# Patient Record
Sex: MALE
[De-identification: names, ages, dates, MRNs, and addresses within clinical notes are randomized per-mention and may not be internally consistent; named-entity substitution may affect disease eponyms.]

---

## 2021-09-17 ENCOUNTER — NON-APPOINTMENT (OUTPATIENT)
Age: 46
End: 2021-09-17

## 2021-09-22 PROBLEM — Z00.00 ENCOUNTER FOR PREVENTIVE HEALTH EXAMINATION: Status: ACTIVE | Noted: 2021-09-22

## 2021-09-23 ENCOUNTER — APPOINTMENT (OUTPATIENT)
Dept: ORTHOPEDIC SURGERY | Facility: CLINIC | Age: 46
End: 2021-09-23
Payer: COMMERCIAL

## 2021-09-23 ENCOUNTER — NON-APPOINTMENT (OUTPATIENT)
Age: 46
End: 2021-09-23

## 2021-09-23 VITALS
DIASTOLIC BLOOD PRESSURE: 90 MMHG | BODY MASS INDEX: 29.8 KG/M2 | WEIGHT: 220 LBS | HEIGHT: 72 IN | SYSTOLIC BLOOD PRESSURE: 137 MMHG | HEART RATE: 76 BPM

## 2021-09-23 DIAGNOSIS — Z87.891 PERSONAL HISTORY OF NICOTINE DEPENDENCE: ICD-10-CM

## 2021-09-23 DIAGNOSIS — G89.29 LOW BACK PAIN: ICD-10-CM

## 2021-09-23 DIAGNOSIS — Z86.39 PERSONAL HISTORY OF OTHER ENDOCRINE, NUTRITIONAL AND METABOLIC DISEASE: ICD-10-CM

## 2021-09-23 DIAGNOSIS — Z86.59 PERSONAL HISTORY OF OTHER MENTAL AND BEHAVIORAL DISORDERS: ICD-10-CM

## 2021-09-23 DIAGNOSIS — Z87.19 PERSONAL HISTORY OF OTHER DISEASES OF THE DIGESTIVE SYSTEM: ICD-10-CM

## 2021-09-23 DIAGNOSIS — F41.1 GENERALIZED ANXIETY DISORDER: ICD-10-CM

## 2021-09-23 DIAGNOSIS — M47.816 SPONDYLOSIS W/OUT MYELOPATHY OR RADICULOPATHY, LUMBAR REGION: ICD-10-CM

## 2021-09-23 DIAGNOSIS — M54.5 LOW BACK PAIN: ICD-10-CM

## 2021-09-23 PROCEDURE — 99204 OFFICE O/P NEW MOD 45 MIN: CPT

## 2021-09-23 PROCEDURE — 72100 X-RAY EXAM L-S SPINE 2/3 VWS: CPT

## 2021-09-23 RX ORDER — IBUPROFEN 800 MG/1
800 TABLET, FILM COATED ORAL
Qty: 90 | Refills: 0 | Status: ACTIVE | COMMUNITY
Start: 2021-09-23 | End: 1900-01-01

## 2021-09-24 PROBLEM — Z86.39 HISTORY OF TYPE 2 DIABETES MELLITUS: Status: RESOLVED | Noted: 2021-09-24 | Resolved: 2021-09-24

## 2021-09-24 PROBLEM — Z86.39 HISTORY OF HYPERLIPIDEMIA: Status: RESOLVED | Noted: 2021-09-24 | Resolved: 2021-09-24

## 2021-09-24 PROBLEM — Z86.59 HISTORY OF DEPRESSION: Status: RESOLVED | Noted: 2021-09-24 | Resolved: 2021-09-24

## 2021-09-24 PROBLEM — Z87.19 HISTORY OF IRRITABLE BOWEL SYNDROME: Status: RESOLVED | Noted: 2021-09-24 | Resolved: 2021-09-24

## 2021-09-24 PROBLEM — F41.1 GENERALIZED ANXIETY DISORDER: Status: RESOLVED | Noted: 2021-09-24 | Resolved: 2021-09-24

## 2021-09-24 PROBLEM — Z87.891 FORMER SMOKER: Status: ACTIVE | Noted: 2021-09-24

## 2021-09-24 NOTE — REVIEW OF SYSTEMS
[Abdominal Pain] : abdominal pain [Diarrhea] : diarrhea [Depression] : depression [Anxiety] : anxiety [Negative] : Genitourinary

## 2021-09-24 NOTE — HISTORY OF PRESENT ILLNESS
[de-identified] : This 45-year-old male started with symptoms of lower back pain 1 year ago.  The symptoms have been persistent and are getting slightly worse.  He has not had associated leg pain.  He does not have neurologic symptoms of numbness, paresthesias or weakness.  There is no night pain.  The pain is worse with coughing and sneezing and slightly worse forcing to move his bowels.  It is worse with prolonged sitting.  It is no worse driving but it is worse getting in and out of the car.  It is worse with bending and lifting.  The symptoms are chronic soreness, ache or mild pain but there is momentary sharp pain.  Treatment to date has been heat and stretching.\par \par The year ago he was diagnosed with type 2 diabetes.  He is on medication for depression and anxiety as well as hyperlipidemia and he has a diarrhea type irritable bowel. [Pain Location] : pain [2] : a minimum pain level of 2/10 [10] : a maximum pain level of 10/10 [Bending] : worsened by bending [Lifting] : worsened by lifting [Prolonged Sitting] : worsened by prolonged sitting

## 2021-09-24 NOTE — PHYSICAL EXAM
[de-identified] : He is fully alert and oriented with a normal mood and affect.  He is in no acute distress as I take the history.  He ambulates with a normal gait including tiptoe and heel walking.  There are no cutaneous abnormalities or palpable bony defects of the spine.  There is no evidence of shortness of breath or respiratory distress.  There is no paravertebral muscle spasm, sciatic notch tenderness or trochanteric tenderness.  Forward flexion of the spine to 40 degrees causes lower back pain.  His lower extremity neurological examination revealed 1-2+ symmetrical reflexes.  Motor power is normal to manual testing in all lower extremity groups and sensation is normal to light touch in all dermatomes.  Straight leg raising is negative to 90 degrees in the sitting position.  There is symmetrically decreased rotation of both hips but without pain.  Vascular examination shows no evidence of varicosities and there is no lymphedema.  There are no cutaneous abnormalities of the upper extremities or of the lower extremities.  His upper extremities are normal to inspection and his elbows have a full and painless range of motion with normal motor power and normal stability. [de-identified] : AP and lateral x-rays of the lumbar spine reveal normal sagittal alignment.  There are osteophytes extending superiorly up off the anterior surfaces of 4,[id number].  They are nearing arthrodesis at the L2-3, L1-2 and T12-L1 levels.  Disc heights are well-maintained.  There are no destructive changes.

## 2021-09-24 NOTE — DISCUSSION/SUMMARY
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  He has been started on ibuprofen 800 mg 3 times a day as a nonsteroidal anti-inflammatory and will use moist heat.  I will see him for follow-up in 4 weeks.  When his symptoms have resolved he will need to work on range of motion of his hips and increasing flexibility of his hamstrings to relieve stress on this arthrodeses osteoarthritis in his lumbar spine.  He will call if there are problems with the medication.

## 2021-10-19 ENCOUNTER — APPOINTMENT (OUTPATIENT)
Dept: ORTHOPEDIC SURGERY | Facility: CLINIC | Age: 46
End: 2021-10-19